# Patient Record
Sex: FEMALE | URBAN - METROPOLITAN AREA
[De-identification: names, ages, dates, MRNs, and addresses within clinical notes are randomized per-mention and may not be internally consistent; named-entity substitution may affect disease eponyms.]

---

## 2024-09-21 ENCOUNTER — EMERGENCY (EMERGENCY)
Facility: HOSPITAL | Age: 64
LOS: 1 days | Discharge: LEFT BEFORE DISCHARGE | End: 2024-09-21
Attending: STUDENT IN AN ORGANIZED HEALTH CARE EDUCATION/TRAINING PROGRAM | Admitting: STUDENT IN AN ORGANIZED HEALTH CARE EDUCATION/TRAINING PROGRAM

## 2024-09-21 VITALS
DIASTOLIC BLOOD PRESSURE: 74 MMHG | RESPIRATION RATE: 15 BRPM | OXYGEN SATURATION: 100 % | SYSTOLIC BLOOD PRESSURE: 163 MMHG | TEMPERATURE: 98 F | HEART RATE: 64 BPM

## 2024-09-21 PROCEDURE — 99284 EMERGENCY DEPT VISIT MOD MDM: CPT

## 2024-09-21 PROCEDURE — 99053 MED SERV 10PM-8AM 24 HR FAC: CPT

## 2024-09-21 PROCEDURE — 93010 ELECTROCARDIOGRAM REPORT: CPT

## 2024-09-21 NOTE — ED ADULT NURSE REASSESSMENT NOTE - NS ED NURSE REASSESS COMMENT FT1
Patient had syncopal episodes in the ICU after the passing of her son, patient refused to answer any question at this time, patient states that "I'm fine I don't want to speak right now"

## 2024-09-21 NOTE — ED PROVIDER NOTE - PATIENT PORTAL LINK FT
You can access the FollowMyHealth Patient Portal offered by [facility] by registering at the following website: [email or web address]. By joining [facility]’s FollowMyHealth portal, you will also be able to view your health information using other applications (apps) compatible with our system.
no

## 2024-09-21 NOTE — ED PROVIDER NOTE - NSFOLLOWUPINSTRUCTIONS_ED_ALL_ED_FT
You were seen in the [facility] Emergency Department today for vasovagal syncope, likely in the setting of acute stress.    All of the lab and imaging results available upon your time of discharge are included in this discharge paperwork. Please take all of your prescribed or over-the-counter medications as prescribed or as directed.    Please follow up with your primary care physician within one week or as needed for continued management and any further evaluation. Or, if you do not have a primary care physician, you may call patient access services at [phone] find a primary care physician, or call [phone] to make an appointment with the clinic.    Please return to the emergency department for any worsening symptoms or concerns.

## 2024-09-21 NOTE — ED PROVIDER NOTE - CLINICAL SUMMARY MEDICAL DECISION MAKING FREE TEXT BOX
64-year-old female Brought to ED after a rapid response was called in medical ICU.  Patient was up in medical ICU with her son, who was admitted for critical illness.  She has syncopized while she was at bedside in the medical ICU.  She left the ICU, and then heard the news that her son had .  She went back to the ICU and once again syncopized, so she was brought to the ED.  Here she is awake, alert, feeling distressed the death of her son.  EKG performed after the presyncopal episode reveals normal sinus rhythm, no malignant dysrhythmia, no evidence of acute ischemia.  I had extensive discussion with the patient and her daughter.  The patient does not wish for any medical evaluation to be performed in the ED, she wishes to be discharged to return to the ICU to be with her son Her vitals are within normal limits, she otherwise has no complaints at this time, EKG was not concerning for acute malignant dysrhythmia or ischemia.  Patient is stable for discharge at this time.  With no further workup.

## 2024-09-21 NOTE — ED ADULT TRIAGE NOTE - CHIEF COMPLAINT QUOTE
Pt had a syncopal episode. pt was visiting son([facility] pt). Pt awake and alert.  denies injury. No complaints of chest pain, headache, nausea, dizziness, vomiting  SOB, fever, chills verbalized. no phx